# Patient Record
Sex: MALE | URBAN - METROPOLITAN AREA
[De-identification: names, ages, dates, MRNs, and addresses within clinical notes are randomized per-mention and may not be internally consistent; named-entity substitution may affect disease eponyms.]

---

## 2018-11-21 ENCOUNTER — NURSE TRIAGE (OUTPATIENT)
Dept: NURSING | Facility: CLINIC | Age: 18
End: 2018-11-21

## 2018-11-21 NOTE — TELEPHONE ENCOUNTER
Raul received a call from [facility] saying his strep culture was positive and where to send antibiotics. He left a message with the clinic to send it to [facility], but he says it's not at the [facility] pharmacy, he is there now. I am not able to access his chart to know what the clinic has done and there isn't an on call provider I can access. Raul is leaving [facility] on a flight in 2 hours. Since [facility] will be closed tomorrow and maybe until Monday because of the holiday, I advised he be seen at an Urgent Care at his destination to be evaluated and treated for strep. He denies fever, rash or severe illness at this time. Just a sore throat. Informed important not to let strep throat go untreated. Patient voices understanding and is in agreement with this plan.     Claribel Alvarado RN, [facility]    Reason for Disposition    [1] Prescription not at pharmacy AND [2] was prescribed today by PCP    Additional Information    Negative: Drug overdose and nurse unable to answer question    Negative: Caller requesting information not related to medicine    Negative: Caller requesting a prescription for Strep throat and has a positive culture result    Negative: Rash while taking a medication or within 3 days of stopping it    Negative: Immunization reaction suspected    Negative: [1] Asthma and [2] having symptoms of asthma (cough, wheezing, etc)    Negative: MORE THAN A DOUBLE DOSE of a prescription or over-the-counter (OTC) drug    Negative: [1] DOUBLE DOSE (an extra dose or lesser amount) of over-the-counter (OTC) drug AND [2] any symptoms (e.g., dizziness, nausea, pain, sleepiness)    Negative: [1] DOUBLE DOSE (an extra dose or lesser amount) of prescription drug AND [2] any symptoms (e.g., dizziness, nausea, pain, sleepiness)    Negative: Took another person's prescription drug    Negative: [1] DOUBLE DOSE (an extra dose or lesser amount) of prescription drug AND [2] NO symptoms (Exception: a double dose  "of antibiotics)    Negative: Diabetes drug error or overdose (e.g., insulin or extra dose)    Negative: [1] Request for URGENT new prescription or refill of \"essential\" medication (i.e., likelihood of harm to patient if not taken) AND [2] triager unable to fill per unit policy    Protocols used: MEDICATION QUESTION CALL-ADULT-    "